# Patient Record
Sex: MALE | Race: OTHER | NOT HISPANIC OR LATINO
[De-identification: names, ages, dates, MRNs, and addresses within clinical notes are randomized per-mention and may not be internally consistent; named-entity substitution may affect disease eponyms.]

---

## 2021-12-23 ENCOUNTER — APPOINTMENT (OUTPATIENT)
Dept: SURGERY | Facility: CLINIC | Age: 71
End: 2021-12-23
Payer: MEDICARE

## 2021-12-23 VITALS
HEIGHT: 67 IN | WEIGHT: 180 LBS | TEMPERATURE: 97.6 F | DIASTOLIC BLOOD PRESSURE: 83 MMHG | RESPIRATION RATE: 18 BRPM | BODY MASS INDEX: 28.25 KG/M2 | OXYGEN SATURATION: 98 % | HEART RATE: 69 BPM | SYSTOLIC BLOOD PRESSURE: 153 MMHG

## 2021-12-23 PROCEDURE — 99203 OFFICE O/P NEW LOW 30 MIN: CPT

## 2021-12-23 PROCEDURE — 46600 DIAGNOSTIC ANOSCOPY SPX: CPT

## 2021-12-23 RX ORDER — PNV NO.95/FERROUS FUM/FOLIC AC 28MG-0.8MG
TABLET ORAL
Refills: 0 | Status: ACTIVE | COMMUNITY

## 2021-12-23 RX ORDER — HYDROCHLOROTHIAZIDE 12.5 MG/1
TABLET ORAL
Refills: 0 | Status: ACTIVE | COMMUNITY

## 2021-12-23 RX ORDER — UBIDECARENONE/VIT E ACET 100MG-5
CAPSULE ORAL
Refills: 0 | Status: ACTIVE | COMMUNITY

## 2021-12-23 NOTE — HISTORY OF PRESENT ILLNESS
[FreeTextEntry1] : Reymundo is a 71 year old male here for a consultation. Colonoscopy by Dr. Jaramillo from 08/09/2021 demonstrated internal hemorrhoids. Benign polyp of cecum\par \par Today pt reports feeling well. Had episode of bleeding with every BM that started 12/2/21 and lasted for 3 days. Denies seeing blood currently. Has 2-3 soft bowel movements daily. Denies straining or pain. Does not feel any swelling or outside tissue around anus. Good appetite, no nausea or voting. Denies use of anticoagulants.

## 2021-12-23 NOTE — CONSULT LETTER
[Dear  ___] : Dear ~DEMETRIUS, [Courtesy Letter:] : I had the pleasure of seeing your patient, [unfilled], in my office today. [Please see my note below.] : Please see my note below. [Consult Closing:] : Thank you very much for allowing me to participate in the care of this patient.  If you have any questions, please do not hesitate to contact me. [Sincerely,] : Sincerely, [DrVeto  ___] : Dr. SELBY [FreeTextEntry2] : Dr. Stew Pool [FreeTextEntry3] : Esteban Khan M.D., ASHER.CHAR., F.[email or web address].\[facility] Chief Colorectal Clinical Services, [facility]

## 2021-12-23 NOTE — PHYSICAL EXAM
[Normal Breath Sounds] : Normal breath sounds [Normal Heart Sounds] : normal heart sounds [No Rash or Lesion] : No rash or lesion [Alert] : alert [Oriented to Place] : oriented to place [Oriented to Person] : oriented to person [Oriented to Time] : oriented to time [Calm] : calm [JVD] : no jugular venous distention  [de-identified] : wnl [de-identified] : wnl [de-identified] : rom wnl [FreeTextEntry1] : Perianal inspection revealed small external tags.  Digital exam with hemorrhoid enlargement.  Anoscopy confirmed a moderate to large right anterior hemorrhoid with minimal squamous metaplasia, consistent with prolapse.

## 2022-02-16 ENCOUNTER — OUTPATIENT (OUTPATIENT)
Dept: OUTPATIENT SERVICES | Facility: HOSPITAL | Age: 72
LOS: 1 days | End: 2022-02-16
Payer: MEDICARE

## 2022-02-16 ENCOUNTER — APPOINTMENT (OUTPATIENT)
Dept: CT IMAGING | Facility: CLINIC | Age: 72
End: 2022-02-16
Payer: MEDICARE

## 2022-02-16 DIAGNOSIS — Z00.8 ENCOUNTER FOR OTHER GENERAL EXAMINATION: ICD-10-CM

## 2022-02-16 PROCEDURE — 71275 CT ANGIOGRAPHY CHEST: CPT | Mod: 26,MH

## 2022-02-16 PROCEDURE — 71275 CT ANGIOGRAPHY CHEST: CPT | Mod: MH

## 2023-05-09 ENCOUNTER — NON-APPOINTMENT (OUTPATIENT)
Age: 73
End: 2023-05-09

## 2023-06-08 ENCOUNTER — APPOINTMENT (OUTPATIENT)
Dept: SURGERY | Facility: CLINIC | Age: 73
End: 2023-06-08
Payer: MEDICARE

## 2023-06-08 VITALS
TEMPERATURE: 97.3 F | OXYGEN SATURATION: 92 % | DIASTOLIC BLOOD PRESSURE: 80 MMHG | RESPIRATION RATE: 16 BRPM | HEART RATE: 68 BPM | SYSTOLIC BLOOD PRESSURE: 124 MMHG

## 2023-06-08 DIAGNOSIS — Z87.39 PERSONAL HISTORY OF OTHER DISEASES OF THE MUSCULOSKELETAL SYSTEM AND CONNECTIVE TISSUE: ICD-10-CM

## 2023-06-08 DIAGNOSIS — Z86.79 PERSONAL HISTORY OF OTHER DISEASES OF THE CIRCULATORY SYSTEM: ICD-10-CM

## 2023-06-08 PROCEDURE — 46221 LIGATION OF HEMORRHOID(S): CPT

## 2023-06-08 PROCEDURE — 99213 OFFICE O/P EST LOW 20 MIN: CPT | Mod: 25

## 2023-06-08 NOTE — PHYSICAL EXAM
[Normal Breath Sounds] : Normal breath sounds [Normal Heart Sounds] : normal heart sounds [No Rash or Lesion] : No rash or lesion [Alert] : alert [Oriented to Person] : oriented to person [Oriented to Place] : oriented to place [Oriented to Time] : oriented to time [Calm] : calm [de-identified] : WNL [de-identified] : WNL [de-identified] : BETTYL [de-identified] : WNL ROM [de-identified] : WNL [FreeTextEntry1] : Perianal inspection unremarkable.  Redundancy noted on digital exam.  Anoscopy demonstrated an extremely large right posterior hemorrhoid with extensive squamous metaplasia consistent with prolapse.  The right anterior hemorrhoid was not as enlarged.\par \par Anoscopy performed to reevaluate the internal hemorrhoids and to place a band.  No sedation required.

## 2023-06-08 NOTE — HISTORY OF PRESENT ILLNESS
[FreeTextEntry1] : Reymundo is a 71 y/o male here for consultation, rectal bleeding, hemorrhoid \par \par Colonoscopy 01/19/22 by Dr. Stew Pool - (rectal bleeding) - Internal hemorrhoids.  Benign polyp of transverse colon.  \par \par Colonoscopy 08/09/21 - by Dr. Pool - (screening) - Internal hemorrhoids.  Benign polyp of cecum.  \par Pathology: Cecum polyp, biopsy: Inflammatory polyp of the colon with focal hyperplastic features.  No viral cytopathic effects, granulomata or dysplasia present.\par \par Last seen 12/23/21 - I have seen and evaluated patient and have corroborated all nursing input into this note. Patient had 3 days of rectal bleeding after pushing hard to have a bowel movement. The bleeding stopped and the patient has been well for the past 3 weeks. He has a moderate to large right anterior hemorrhoid. However, he is now asymptomatic and his previous symptoms were related to a hard bowel movement. Therefore, there is no indication for acute intervention today. If the patient develops recurrent symptoms he will return to my office for internal hemorrhoid rubber band ligation. I reviewed indications, risk, benefits, alternatives including but not limited to bleeding, infection, and failure. All questions answered. \par \par Today pt reports feeling well no pain.  Had episode of bleeding with every BM that started end of December 2021.  The bleeding restarted again January, February and March of this year for 1-3 days.  On 05/22-5/28 had blood in stool and bleeding when wiping.   Was seen by Dr. Falcon last week Rx hydrocortisone cream but never used it.  Soft 2-3 times daily BMs, sometimes straining.  Denies feel any swelling or outside tissue around anus.  Good appetite. No n/v, fever or chills.  Not on any ACG.  \par

## 2023-06-08 NOTE — ASSESSMENT
[FreeTextEntry1] : Patient with persistent recurrent bleeding.  Exam today revealed a large right posterior hemorrhoid with extensive squamous metaplasia.  His prior exam demonstrated that the right anterior hemorrhoid was the largest.  That hemorrhoid had not grown in size since the last exam.  Therefore I recommended banding of the right posterior hemorrhoid.  Indications, risks, benefits, alternatives reviewed including but not limited to bleeding, infection, and failure.  All questions answered.  Patient agreed.  Right posterior hemorrhoid banded.  Post band instruction sheet reviewed.  Patient will follow-up as needed for further bands.

## 2023-06-22 ENCOUNTER — APPOINTMENT (OUTPATIENT)
Dept: SURGERY | Facility: CLINIC | Age: 73
End: 2023-06-22
Payer: MEDICARE

## 2023-06-22 VITALS
SYSTOLIC BLOOD PRESSURE: 118 MMHG | HEART RATE: 65 BPM | OXYGEN SATURATION: 95 % | RESPIRATION RATE: 18 BRPM | DIASTOLIC BLOOD PRESSURE: 69 MMHG | TEMPERATURE: 97.2 F

## 2023-06-22 DIAGNOSIS — Z80.0 FAMILY HISTORY OF MALIGNANT NEOPLASM OF DIGESTIVE ORGANS: ICD-10-CM

## 2023-06-22 PROCEDURE — 46600 DIAGNOSTIC ANOSCOPY SPX: CPT

## 2023-06-22 NOTE — PHYSICAL EXAM
[FreeTextEntry1] : Perianal inspection and digital exam unremarkable.  Anoscopy revealed a healing ulcer on the right posterior position.  Right anterior internal hemorrhoid enlarged.\par \par Anoscopy performed to evaluate prior band site and right anterior hemorrhoid.  No sedation required.

## 2023-06-22 NOTE — HISTORY OF PRESENT ILLNESS
[FreeTextEntry1] : Reymundo is a 71 y/o male here for follow up visit, RBL\par \par s/p RBL Right Posterior Hemorrhoid on 06/08/23\par \par Colonoscopy 01/19/22 by Dr. Stew Pool - (rectal bleeding) - Internal hemorrhoids. Benign polyp of transverse colon. \par \par Colonoscopy 08/09/21 - by Dr. Pool - (screening) - Internal hemorrhoids. Benign polyp of cecum. \par Pathology: Cecum polyp, biopsy: Inflammatory polyp of the colon with focal hyperplastic features. No viral cytopathic effects, granulomata or dysplasia present.\par \par Last seen 06/08/23 - Patient with persistent recurrent bleeding. Exam today revealed a large right posterior hemorrhoid with extensive squamous metaplasia. His prior exam demonstrated that the right anterior hemorrhoid was the largest. That hemorrhoid had not grown in size since the last exam. Therefore I recommended banding of the right posterior hemorrhoid. Indications, risks, benefits, alternatives reviewed including but not limited to bleeding, infection, and failure. All questions answered. Patient agreed. Right posterior hemorrhoid banded. Post band instruction sheet reviewed. Patient will follow-up as needed for further bands. \par \par Today patient report BRBPR with BM dripping in to toilet bowl 4 days before and continued for 2 days. No bleeding since yesterday. Denies pain. 2-3 soft BM daily. Appetite is good, no nausea, no vomiting.Reports prolapsing tissues goes in itself. Patient is not on any anticoagulants.

## 2023-06-22 NOTE — ASSESSMENT
[FreeTextEntry1] : Perianal inspection unremarkable.  Digital exam with hemorrhoid enlargement.  Anoscopy with healing ulcer and enlarged right anterior hemorrhoid.  Bleeding most likely from band separation.  Since the bleeding was recent I recommended against banding the right anterior hemorrhoid today.  If the patient continues to have bleeding over the next 2 to 3 weeks he will return to my office for banding of the right anterior hemorrhoid.  If the patient has heavy bleeding he will call my office immediately.  All patient questions answered.

## 2024-05-02 ENCOUNTER — APPOINTMENT (OUTPATIENT)
Dept: SURGERY | Facility: CLINIC | Age: 74
End: 2024-05-02
Payer: MEDICARE

## 2024-05-02 VITALS
TEMPERATURE: 96.7 F | RESPIRATION RATE: 16 BRPM | HEART RATE: 85 BPM | SYSTOLIC BLOOD PRESSURE: 111 MMHG | DIASTOLIC BLOOD PRESSURE: 76 MMHG | OXYGEN SATURATION: 97 %

## 2024-05-02 PROCEDURE — 46600 DIAGNOSTIC ANOSCOPY SPX: CPT

## 2024-05-02 PROCEDURE — 99213 OFFICE O/P EST LOW 20 MIN: CPT | Mod: 25

## 2024-05-02 NOTE — HISTORY OF PRESENT ILLNESS
[FreeTextEntry1] : Reymundo is a 71 y/o male here for follow up visit, hemorrhoid  Colonoscopy 08/09/21 - by Dr. Pool - (screening) - Internal hemorrhoids. Benign polyp of cecum. Pathology: Cecum polyp, biopsy: Inflammatory polyp of the colon with focal hyperplastic features. No viral cytopathic effects, granulomata or dysplasia present.  Colonoscopy 01/19/22 by Dr. Stew Pool - (rectal bleeding) - Internal hemorrhoids. Benign polyp of transverse colon.  s/p RBL Right Posterior Hemorrhoid on 06/08/23  Last seen 06/22/23 - Perianal inspection unremarkable. Digital exam with hemorrhoid enlargement. Anoscopy with healing ulcer and enlarged right anterior hemorrhoid. Bleeding most likely from band separation. Since the bleeding was recent I recommended against banding the right anterior hemorrhoid today. If the patient continues to have bleeding over the next 2 to 3 weeks he will return to my office for banding of the right anterior hemorrhoid. If the patient has heavy bleeding he will call my office immediately. All patient questions answered.  Today pt reports feeling rectal discomfort.  Soft BMs 3 times daily, sometimes straining.  BRBPR since beginning of April, last episode was 3 weeks ago into toilet bowl and tp.  Denies prolapsing tissue or swelling.  No episodes of incontinence of stool or flatus.  Good appetite.  No c/o nausea/vomiting.  Denies fever and chills.  Not on anticoagulants.

## 2024-05-02 NOTE — ASSESSMENT
[FreeTextEntry1] : Patient with recurrent bleeding.  Further banding recommended.  However the procedure was deferred today because of the presence of solid stool in the distal rectal vault.  The patient will return to my office for banding after preparation.  The left lateral and right anterior hemorrhoids will both be banded at the visit.

## 2024-05-02 NOTE — PHYSICAL EXAM
[FreeTextEntry1] : Perianal inspection unremarkable.  Digital exam and anoscopy demonstrated large left lateral and right anterior internal hemorrhoids.  Solid stool was present in the distal rectal vault.  Anoscopy performed to evaluate hemorrhoids.  No sedation required.

## 2024-06-06 ENCOUNTER — APPOINTMENT (OUTPATIENT)
Dept: SURGERY | Facility: CLINIC | Age: 74
End: 2024-06-06
Payer: MEDICARE

## 2024-06-06 VITALS
RESPIRATION RATE: 18 BRPM | SYSTOLIC BLOOD PRESSURE: 115 MMHG | DIASTOLIC BLOOD PRESSURE: 74 MMHG | OXYGEN SATURATION: 96 % | HEART RATE: 52 BPM | TEMPERATURE: 95.8 F

## 2024-06-06 DIAGNOSIS — K62.5 HEMORRHAGE OF ANUS AND RECTUM: ICD-10-CM

## 2024-06-06 PROCEDURE — 46221 LIGATION OF HEMORRHOID(S): CPT

## 2024-06-06 NOTE — ASSESSMENT
[FreeTextEntry1] : Left lateral and right anterior internal hemorrhoids banded as planned.  Post band instruction sheet reviewed.  All questions answered.  Follow-up as needed.

## 2024-06-06 NOTE — PHYSICAL EXAM
[FreeTextEntry1] : Perianal inspection unremarkable.  Digital exam and anoscopy with enlarged left lateral and right anterior hemorrhoids.  Anoscopy performed to evaluate internal hemorrhoids.  No sedation required.

## 2024-06-06 NOTE — HISTORY OF PRESENT ILLNESS
[FreeTextEntry1] : Reymundo is a 71 y/o male here for follow up visit, RBL  Colonoscopy 01/19/22 by Dr. Stew Pool - (rectal bleeding) - Internal hemorrhoids. Benign polyp of transverse colon.  s/p RBL Right Posterior Hemorrhoid on 06/08/23  Last seen 5/2/24 - Patient with recurrent bleeding. Further banding recommended. However the procedure was deferred today because of the presence of solid stool in the distal rectal vault. The patient will return to my office for banding after preparation. The left lateral and right anterior hemorrhoids will both be banded at the visit.  Today pt reports feeling no pain.  Regular BMs 1-2 times daily, sometimes straining.  Intermittent BRBPR in April, since last office visit no bleeding.  Denies prolapsing tissue or swelling.  No incontinence of stool or flatus.  Denies fever or chills. Not on any ACG.

## 2024-06-20 ENCOUNTER — APPOINTMENT (OUTPATIENT)
Dept: SURGERY | Facility: CLINIC | Age: 74
End: 2024-06-20
Payer: MEDICARE

## 2024-06-20 VITALS
HEART RATE: 65 BPM | TEMPERATURE: 96.9 F | RESPIRATION RATE: 17 BRPM | SYSTOLIC BLOOD PRESSURE: 114 MMHG | DIASTOLIC BLOOD PRESSURE: 69 MMHG | OXYGEN SATURATION: 95 %

## 2024-06-20 DIAGNOSIS — K64.1 SECOND DEGREE HEMORRHOIDS: ICD-10-CM

## 2024-06-20 DIAGNOSIS — K64.8 OTHER HEMORRHOIDS: ICD-10-CM

## 2024-06-20 PROCEDURE — 46600 DIAGNOSTIC ANOSCOPY SPX: CPT

## 2024-06-20 RX ORDER — CIPROFLOXACIN HYDROCHLORIDE 500 MG/1
500 TABLET, FILM COATED ORAL
Qty: 14 | Refills: 2 | Status: ACTIVE | COMMUNITY
Start: 2024-06-20 | End: 1900-01-01

## 2024-06-20 RX ORDER — METRONIDAZOLE 500 MG/1
500 TABLET ORAL TWICE DAILY
Qty: 14 | Refills: 2 | Status: ACTIVE | COMMUNITY
Start: 2024-06-20 | End: 1900-01-01

## 2024-06-20 NOTE — HISTORY OF PRESENT ILLNESS
[FreeTextEntry1] : Reymundo is a 74 y/o male here for follow up visit, rectal discomfort  Colonoscopy 01/19/22 by Dr. Stew Pool - (rectal bleeding) - Internal hemorrhoids. Benign polyp of transverse colon.  s/p RBL Right Posterior Hemorrhoid on 06/08/23 s/p RBL Left Lateral and Right Anterior 6/6/24       Last seen 6/6/24 - Left lateral and right anterior internal hemorrhoids banded as planned. Post band instruction sheet reviewed. All questions answered. Follow-up as needed.  Today pt reports anal discomfort and sometimes dull pain with BM - didnt have this before RBL (started 3 days after). Daily 1-3 times BMs, formed -sometimes hard, no routine use of fiber supplements/laxatives, with bleeding on tp and sometimes dripping into bowl - bleeding sometimes while passing flatus as well, no episodes of incontinence, and does feel prolapsed tissue. Denies use of creams/ointments. Not taking any anticoagulants. RBL didnt help pt.

## 2024-06-20 NOTE — ASSESSMENT
[FreeTextEntry1] : Possible local infection at band sites resulting in discomfort.  Patient still has significant residual hemorrhoid enlargement.  Cipro and Flagyl for 1 week prescribed.  Follow-up 1 month.  Options at that point will be further bands or surgery if the patient is still symptomatic with bleeding.  The patient will follow-up emergently if his current symptoms do not improve with the antibiotics.

## 2024-06-20 NOTE — PHYSICAL EXAM
[FreeTextEntry1] : Perianal inspection unremarkable.  Digital exam palpable redundancy.  Anoscopy with necrotic tissue at band sites.  Anoscopy performed to evaluate band sites.

## 2024-07-11 ENCOUNTER — APPOINTMENT (OUTPATIENT)
Dept: SURGERY | Facility: CLINIC | Age: 74
End: 2024-07-11